# Patient Record
Sex: FEMALE | Race: WHITE
[De-identification: names, ages, dates, MRNs, and addresses within clinical notes are randomized per-mention and may not be internally consistent; named-entity substitution may affect disease eponyms.]

---

## 2019-07-10 ENCOUNTER — HOSPITAL ENCOUNTER (EMERGENCY)
Dept: HOSPITAL 75 - ER FS | Age: 19
Discharge: HOME | End: 2019-07-10
Payer: COMMERCIAL

## 2019-07-10 VITALS — WEIGHT: 115 LBS | BODY MASS INDEX: 20.38 KG/M2 | HEIGHT: 63 IN

## 2019-07-10 VITALS — SYSTOLIC BLOOD PRESSURE: 110 MMHG | DIASTOLIC BLOOD PRESSURE: 75 MMHG

## 2019-07-10 DIAGNOSIS — S13.4XXA: ICD-10-CM

## 2019-07-10 DIAGNOSIS — R40.2252: ICD-10-CM

## 2019-07-10 DIAGNOSIS — S09.90XA: Primary | ICD-10-CM
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
## 2019-07-10 DIAGNOSIS — R40.2142: ICD-10-CM

## 2019-07-10 DIAGNOSIS — R40.2362: ICD-10-CM

## 2019-07-10 PROCEDURE — 99282 EMERGENCY DEPT VISIT SF MDM: CPT

## 2019-07-10 NOTE — XMS REPORT
[facility]

                             Created on: 2018



BirdeldaTrini valiente

External Reference #: [id number]

: 2000

Sex: Female



Demographics







                          Address                   [address] [place]  [address]

 

                          Preferred Language        Unknown

 

                          Marital Status            Unknown

 

                          Sikhism Affiliation     Unknown

 

                          Race                      Unknown

 

                          Ethnic Group              Unknown





Author







                          Author                    BENOITNIXONELE

 

                          Organization              [facility]

 

                          Address                   [address] [place]  [address]



 

                          Phone                     [phone]







Care Team Providers







                    Care Team Member Name    Role                Phone

 

                    LABOYSAMMIE VALIENTE     Unavailable         [phone]







PROBLEMS







          Type      Condition    ICD9-CM Code    NUM68-XJ Code    Onset Dates    Condition Status    SNOMED

 Code

 

          Problem    Hair loss              L65.9               Active    [id number]

 

          Problem    Chronic fatigue              R53.82              Active    18461057

 

          Problem    Hypercholesteremia              E78.00              Active    94240601







ALLERGIES

No Information



ENCOUNTERS







                Encounter       Location        Date            Diagnosis

 

                          [facility]    [id number] [place] [address]

                          01 May, 2018               

 

                          [facility]     [address] [address]

                                         

 

                          [facility]     [id number] [place] [address]

                          12 Sep, 2017              Encounter to establish care with new doctor Z76.89 ; Chronic fatigue

 R53.82 ; Hair loss L65.9 and Vaginal candidiasis B37.3

 

                          [facility]     [id number] [place] [address]

                          08 Sep, 2017               

 

                          [facility]     [id number] [place] [address]

                          21 Aug, 2017              Chronic fatigue R53.82 and Hair loss L65.9

 

                          [facility]    [address] [place] [id number]

                                        Dental examination V72.2

 

                          [facility]     [address] [address]

                                        Headache 784.0 and Sinusitis 473.9

 

                          [facility]     [address] [address]

                          08 Sep, 2014               

 

                          [facility]     [address] [facility] [id number] [address]

                          08 Sep, 2014               

 

                          [facility]     [address] [facility] [id number] [place] [address]

                          16 Sep, 2013               

 

                          [facility]     [address] [facility] [id number] [place] [address]

                          15 Aug, 2011               

 

                          [facility]     [address] [facility] [id number] [place] [address]

                          12 May, 2011               







IMMUNIZATIONS

No Known Immunizations



SOCIAL HISTORY

Never Assessed



REASON FOR VISIT

triage JStrasserRN



PLAN OF CARE





VITAL SIGNS







                    Height              53 in               2018

 

                    Weight              108 lbs             2018

 

                    Temperature         98.5 degrees Fahrenheit    2018

 

                    Heart Rate          84 bpm              2018

 

                    Respiratory Rate    20                  2018

 

                    BMI                 27.03 kg/m2         2018

 

                    Blood pressure systolic    100 mmHg            2018

 

                    Blood pressure diastolic    70 mmHg             2018







MEDICATIONS

Unknown Medications



RESULTS

No Results



PROCEDURES

No Known procedures



INSTRUCTIONS





MEDICATIONS ADMINISTERED

No Known Medications

## 2019-07-10 NOTE — XMS REPORT
[facility]

                             Created on: 2018



BirdeldaTrini valiente

External Reference #: [id number]

: 2000

Sex: Female



Demographics







                          Address                   [address] [place]  [address]

 

                          Preferred Language        Unknown

 

                          Marital Status            Unknown

 

                          Sikh Affiliation     Unknown

 

                          Race                      Unknown

 

                          Ethnic Group              Unknown





Author







                          Author                    LABOYSAMMIE VALIENTE

 

                          Organization              [facility]

 

                          Address                   [address] [place]  [address]



 

                          Phone                     [phone]







Care Team Providers







                    Care Team Member Name    Role                Phone

 

                    LABOYSAMMIE VALIENTE     Unavailable         [phone]







PROBLEMS







          Type      Condition    ICD9-CM Code    UHH60-JZ Code    Onset Dates    Condition Status    SNOMED

 Code

 

          Problem    Hair loss              L65.9               Active    385202925

 

          Problem    Chronic fatigue              R53.82              Active    98175523

 

          Problem    Hypercholesteremia              E78.00              Active    96406320







ALLERGIES

No Known Allergies



ENCOUNTERS







                Encounter       Location        Date            Diagnosis

 

                          [place] [address] [address]

                                         

 

                          [facility]     [address] [address]

                                         

 

                          [facility]     [id number] [place] [address]

                          12 Sep, 2017              Encounter to establish care with new doctor Z76.89 ; Chronic fatigue

 R53.82 ; Hair loss L65.9 and Vaginal candidiasis B37.3

 

                          [place] [id number] [place] [address]

                          08 Sep, 2017               

 

                          [facility]     [id number] [place] [address]

                          21 Aug, 2017              Chronic fatigue R53.82 and Hair loss L65.9

 

                          [facility]    [address] [place] [id number]

                                        Dental examination V72.2

 

                          [address] [address]

                                        Headache 784.0 and Sinusitis 473.9

 

                          [place] [address] [address]

                          08 Sep, 2014               

 

                          [facility]     [address] [facility] [id number] [place] [address]

                          08 Sep, 2014               

 

                          [facility]     [address] [facility] [id number] [place] [address]

                          16 Sep, 2013               

 

                          [facility]     [address] [facility] [id number] [place] [address]

                          15 Aug, 2011               

 

                          [facility]     [address] [facility] [id number] [place] [address]

                          12 May, 2011               







IMMUNIZATIONS

No Known Immunizations



SOCIAL HISTORY

Never Assessed



REASON FOR VISIT

Fatigue/ hair loss x several months---Quan, seen by PCP, Yenni Moura, anita boone feels nothing was done and would like second opinion, has records



PLAN OF CARE







                          Activity                  Details









                                         









                          Follow Up                 4 Weeks Reason:est care







VITAL SIGNS







                    Height              53 in               2017

 

                    Weight              114 lbs             2017

 

                    Temperature         97.7 degrees Fahrenheit    2017

 

                    Heart Rate          80 bpm              2017

 

                    Respiratory Rate    20                  2017

 

                    BMI                 28.53 kg/m2         2017

 

                    Blood pressure systolic    98 mmHg             2017

 

                    Blood pressure diastolic    64 mmHg             2017







MEDICATIONS

Unknown Medications



RESULTS

No Results



PROCEDURES







                Procedure       Date Ordered    Result          Body Site

 

                Hemoglobin Test Send Out 0 dollar    Aug 21, 2017                     

 

                COMPLETE CBC W/AUTO DIFF WBC    Aug 21, 2017                     

 

                ASSAY THYROID STIM HORMONE    Aug 21, 2017                     

 

                VENIPUNCT, ROUTINE*    Aug 21, 2017                     







INSTRUCTIONS





MEDICATIONS ADMINISTERED

No Known Medications

## 2019-07-10 NOTE — XMS REPORT
[facility]

                             Created on: 2018



BirdeldaTrini valiente

External Reference #: [id number]

: 2000

Sex: Female



Demographics







                          Address                   [address] [place]  [address]

 

                          Preferred Language        Unknown

 

                          Marital Status            Unknown

 

                          Judaism Affiliation     Unknown

 

                          Race                      Unknown

 

                          Ethnic Group              Unknown





Author







                          Author                    LABOYSAMMIE VALIENTE

 

                          Organization              [facility]

 

                          Address                   [address] [place]  [address]



 

                          Phone                     [phone]







Care Team Providers







                    Care Team Member Name    Role                Phone

 

                    LABOYSAMMIE VALIENTE     Unavailable         [phone]







PROBLEMS







          Type      Condition    ICD9-CM Code    SNH41-NL Code    Onset Dates    Condition Status    SNOMED

 Code

 

          Problem    Hair loss              L65.9               Active    800562440

 

          Problem    Chronic fatigue              R53.82              Active    65478268

 

          Problem    Hypercholesteremia              E78.00              Active    14374157







ALLERGIES

No Known Allergies



ENCOUNTERS







                Encounter       Location        Date            Diagnosis

 

                          [address] [place] [address] [address]

                                         

 

                          [facility]     [address] [address]

                                         

 

                          [facility]     [id number] [place] [address]

                          12 Sep, 2017              Encounter to establish care with new doctor Z76.89 ; Chronic fatigue

 R53.82 ; Hair loss L65.9 and Vaginal candidiasis B37.3

 

                          [place] [id number] [place] [address]

                          08 Sep, 2017               

 

                          [facility]     [id number] [place] [address]

                          21 Aug, 2017              Chronic fatigue R53.82 and Hair loss L65.9

 

                          [facility]    [address] [place] [phone]

                                        Dental examination V72.2

 

                          [address] [address] [address]

                                        Headache 784.0 and Sinusitis 473.9

 

                          [address] [address] [address]

                          08 Sep, 2014               

 

                          [facility]     [address] [facility] [id number] [address]

                          08 Sep, 2014               

 

                          [facility]     [address] [facility] [id number], [place] [address]

                          16 Sep, 2013               

 

                          [facility]     [address] [facility] [id number], [place] [address]

                          15 Aug, 2011               

 

                          [facility]     [id number] [address]

                          12 May, 2011               







IMMUNIZATIONS

No Known Immunizations



SOCIAL HISTORY

Never Assessed



REASON FOR VISIT

PMH obtained.  TGrosdidikimberly RN



PLAN OF CARE





VITAL SIGNS





MEDICATIONS

Unknown Medications



RESULTS

No Results



PROCEDURES

No Known procedures



INSTRUCTIONS





MEDICATIONS ADMINISTERED

No Known Medications

## 2019-07-10 NOTE — XMS REPORT
Continuity of Care Document

                             Created on: 07/10/2019



Trini Bowles

External Reference #: [id number]

: 2000

Sex: Female



Demographics







                          Address                   [address]

[place]  [address]

 

                          Home Phone                [phone]

 

                          Preferred Language        Unknown

 

                          Marital Status            Unknown

 

                          Sikh Affiliation     Unknown

 

                          Race                      Unknown

 

                          Ethnic Group              Unknown





Author







                          Organization              Unknown

 

                          Address                   Unknown

 

                          Phone                     [phone]



              



Allergies

      



There is no data.                  



Medications

      



There is no data.                  



Problems

      





             Date Dx Coded              Attending              Type              Code              Diagnosis

                                        Diagnosed By        

 

                2011              IZZY SOMMER DDS N                              564.00     

                          UNSPECIFIED CONSTIPATION                       

 

                2011              IZZY SOMMER DDS N                              788.1      

                          DYSURIA                            

 

                08/15/2011              IZZY SOMMER DDS N                              238.2      

                          NEOPLASM OF UNCERTAIN BEHAVIOR OF SKIN                       



                      



Procedures

      



There is no data.                  



Results

      





                    Test                Result              Range        









                                        HEPATITIS PROFILE - 17 10:31         









                    Hep A Ab, IgM              Negative               Negative        

 

                    HBsAg Screen              Negative               Negative        

 

                    Hep B Core Ab, IgM              Negative               Negative        

 

                    Hep C Virus Ab              <0.1 s/co ratio              0.0-0.9        









                                        GC/CHLAMYDIA (SWAB OR URINE)-RAPID - 19 11:55         









                    CHLAMYDIA TRACHOMATIS RNA, TMA              NOT DETECTED               NOT DETECTED 

       

 

                    NEISSERIA GONORRHOEAE RNA, TMA              NOT DETECTED               NOT DETECTED 

       

 

                    COMMENT                                  NRG        









                                        GC/CHLAMYDIA (SWAB OR URINE)-RAPID - 19 15:07         









                    CHLAMYDIA TRACHOMATIS RNA, TMA              NOT DETECTED               NOT DETECTED 

       

 

                    NEISSERIA GONORRHOEAE RNA, TMA              NOT DETECTED               NOT DETECTED 

       

 

                    COMMENT                                  NRG        



                    



Encounters

      





                ACCT No.              Visit Date/Time              Discharge              Status      

                Pt. Type              Provider              Facility              Loc./Unit      

                                        Complaint        

 

                [id number]              2019 10:40:00              2019 23:59:59              

CLS              Outpatient              KRYSTAL VILLALBA                              [facility]

 [place] [facility]                                 

 

             [id number]              2019 14:20:00                                            Document

 Registration                                                                       

 

             [id number]              2019 10:40:00                                            Document

 Registration                                                                       

 

             [id number]              2017 09:20:00                                            Document

 Registration                                                                       

 

                [id number]              2011 14:41:00              2011 23:59:59              

CLS                 Outpatient              IZZY SOMMER DDS

## 2019-07-10 NOTE — XMS REPORT
[facility]

                             Created on: 2018



KameronTrini valiente

External Reference #: [id number]

: 2000

Sex: Female



Demographics







                          Address                   [address] [place]  [address]

 

                          Preferred Language        Unknown

 

                          Marital Status            Unknown

 

                          Scientologist Affiliation     Unknown

 

                          Race                      Unknown

 

                          Ethnic Group              Unknown





Author







                          Author                    LABOYSAMMIE VALIENTE

 

                          Organization              [facility]

 

                          Address                   [address] [place]  [address]



 

                          Phone                     [phone]







Care Team Providers







                    Care Team Member Name    Role                Phone

 

                    LABOYSAMMIE VALIENTE     Unavailable         [phone]







PROBLEMS







          Type      Condition    ICD9-CM Code    FXR19-JO Code    Onset Dates    Condition Status    SNOMED

 Code

 

          Problem    Hair loss              L65.9               Active    684221836

 

          Problem    Chronic fatigue              R53.82              Active    95554515

 

          Problem    Hypercholesteremia              E78.00              Active    82125015







ALLERGIES

No Known Allergies



ENCOUNTERS







                Encounter       Location        Date            Diagnosis

 

                          [facility]     [id number] [place] [address]

                                         

 

                          [facility]     [id number] [place] [address]

                          12 Sep, 2017              Encounter to establish care with new doctor Z76.89 ; Chronic fatigue

 R53.82 ; Hair loss L65.9 and Vaginal candidiasis B37.3

 

                          [facility]     [id number] [place] [address]

                          08 Sep, 2017               

 

                          [facility]     [id number] [place] [address]

                          21 Aug, 2017              Chronic fatigue R53.82 and Hair loss L65.9

 

                          [facility]    [id number] [place] [id number]

                                        Dental examination V72.2

 

                          [facility]     [address] [address]

                                        Headache 784.0 and Sinusitis 473.9

 

                          [facility]     [id number] [place] [address]

                          08 Sep, 2014               

 

                          [facility]     [address] [address]

                          08 Sep, 2014               

 

                          [facility]     [address] [facility] [id number] [address]

                          16 Sep, 2013               

 

                          [facility]     [address] [facility] [id number] [address]

                          15 Aug, 2011               

 

                          [facility]     [address] [facility] [id number] [address]

                          12 May, 2011               







IMMUNIZATIONS

No Known Immunizations



SOCIAL HISTORY

Never Assessed



REASON FOR VISIT

Establish Care---ZANDRAennettRMADELYN



PLAN OF CARE







                          Activity                  Details









                                         









                          Follow Up                 4 Weeks Reason:weight loss follow up







VITAL SIGNS







                    Height              53 in               2017

 

                    Weight              107 lbs             2017

 

                    Temperature         97.6 degrees Fahrenheit    2017

 

                    Heart Rate          80 bpm              2017

 

                    Respiratory Rate    20                  2017

 

                    BMI                 26.78 kg/m2         2017

 

                    Blood pressure systolic    102 mmHg            2017

 

                    Blood pressure diastolic    70 mmHg             2017







MEDICATIONS







        Medication    Instructions    Dosage    Frequency    Start Date    End Date    Duration    Status



 

             Diflucan 150 MG    Orally Once a day    1 tablet today and repeat in 7 days    24h          12

 Sep, 2017          14 Sep, 2017        2 days              Active







RESULTS







                Name            Result          Date            Reference Range

 

                VITAMIN B12                     2017       

 

                Vitamin B12     498                             211-946

 

                FOLATE (FOLIC ACID)                    2017       

 

                Folate (Folic Acid), Serum    >20.0                           >3.0

 

                ESR/SED RATE                    2017       

 

                Sedimentation Rate-Westergren    2                               0-32

 

                CRP                             2017       

 

                C-Reactive Protein, Quant    <0.3                            0.0-4.9

 

                VITAMIN D, 25-H                    2017       

 

                Vitamin D, 25-Hydroxy    36.0                            30.0-100.0

 

                LIPID PANEL                     2017       

 

                Cholesterol, Total    177                             100-169

 

                Triglycerides    75                              0-89

 

                HDL Cholesterol    61                              >39

 

                VLDL Cholesterol Saúl    15                              5-40

 

                LDL Cholesterol Calc    101                             0-109

 

                CMP                             2017       

 

                Glucose, Serum    81                              65-99

 

                BUN             8                               6-20

 

                Creatinine, Serum    0.74                            0.57-1.00

 

                eGFR If NonAfricn Am    118                                 >59

 

                eGFR If Africn Am    136                                 >59

 

                BUN/Creatinine Ratio    11                              9-23

 

                Sodium, Serum    141                             134-144

 

                Potassium, Serum    4.1                             3.5-5.2

 

                Chloride, Serum    102                             

 

                Carbon Dioxide, Total    24                              18-29

 

                Calcium, Serum    9.5                             8.7-10.2

 

                Protein, Total, Serum    6.7                             6.0-8.5

 

                Albumin, Serum    4.4                             3.5-5.5

 

                Globulin, Total    2.3                             1.5-4.5

 

                A/G Ratio       1.9                             1.2-2.2

 

                Bilirubin, Total    0.4                             0.0-1.2

 

                Alkaline Phosphatase, S    33                              

 

                AST (SGOT)      12                              0-40

 

                ALT (SGPT)      15                              0-32

 

                HEPATITIS PROFILE                    2017       

 

                Hep A Ab, IgM    Negative                        Negative

 

                HBsAg Screen    Negative                        Negative

 

                Hep B Core Ab, IgM    Negative                        Negative

 

                Hep C Virus Ab    <0.1                            0.0-0.9

 

                HIV (STATE)                     2017       







PROCEDURES







                Procedure       Date Ordered    Result          Body Site

 

                VENIPUNCT, ROUTINE*    2017                     

 

                COMPREHEN METABOLIC PANEL    2017                     

 

                LIPID PANEL     2017                     

 

                ACUTE HEPATITIS PANEL    2017                     

 

                C-REACTIVE PROTEIN    2017                     

 

                No Charge       2017                     

 

                ASSAY OF VITAMIN D    2017                     

 

                VITAMIN B-12    2017                     

 

                RBC SED RATE, AUTOMATED    2017                     

 

                BLOOD FOLIC ACID SERUM    2017                     







INSTRUCTIONS





MEDICATIONS ADMINISTERED

No Known Medications

## 2019-07-10 NOTE — XMS REPORT
[facility]

                             Created on: 2015



Trini Bowles

External Reference #: [id number]

: 2000

Sex: Female



Demographics







                          Address                   [address] [place] [address]  [address]

 

                          Home Phone                [phone]

 

                          Preferred Language        Unknown

 

                          Marital Status            Unknown

 

                          Sabianism Affiliation     Unknown

 

                          Race                      White

 

                          Ethnic Group              Not  or 





Author







                          Author                    KELSEY YANEZ

 

                          Organization              [employer]

 

                          Address                   Unknown

 

                          Phone                     Unavailable







Care Team Providers







                    Care Team Member Name    Role                Phone

 

                    KELSEY YANEZ                      Unavailable



                                                                



Allergies

          No Known Allergies                                                    
                                   



Problems

          





             Problem Type    Condition    ICD-9 Code    Onset Dates    Condition Status

 

             Assessment    Dental examination    V72.2                     Active



                                                                                
       



Medications

          No Known Medications                                                  
                                     



Procedures

          





                Procedure       Coding System    Code            Date

 

                PANORAMIC FILM SEE ALSO CODE 24878    CPT-4                      2015

 

                LTD ORAL EVALUATION - PROBLEM FOCUS    CPT-4                      2015



                                                                                
       



Results

          No Known Results                                                      
             



Summary Purpose

          [employer] Submission

## 2019-07-10 NOTE — XMS REPORT
[facility]

                             Created on: 10/24/2018



JelenaTriin

External Reference #: [id number]

: 2000

Sex: Female



Demographics







                          Address                   [address]

[place]  [address]

 

                          Preferred Language        Unknown

 

                          Marital Status            Unknown

 

                          Zoroastrianism Affiliation     Unknown

 

                          Race                      Unknown

 

                          Ethnic Group              Unknown





Author







                          Author                    ESA KIMBLE

 

                          [facility]

 

                          Address                   Unknown

 

                          Phone                     [phone]







Care Team Providers







                    Care Team Member Name    Role                Phone

 

                    ESA KIMBLE     Unavailable         [phone]







PROBLEMS







          Type      Condition    ICD9-CM Code    PLO61-QX Code    Onset Dates    Condition Status    SNOMED

 Code

 

          Problem    Hair loss              L65.9               Active    517487996

 

          Problem    Chronic fatigue              R53.82              Active    05294020

 

          Problem    Hypercholesteremia              E78.00              Active    26596242







ALLERGIES

No Information



ENCOUNTERS







                Encounter       Location        Date            Diagnosis

 

                          [facility]    [address] [id number]

                          22 Oct, 2018               

 

                          [facility]    [id number] [place] [address]

                          01 May, 2018               

 

                          [facility]     [address] [address]

                                         

 

                          [facility]     [address] [address]

                          12 Sep, 2017              Encounter to establish care with new doctor Z76.89 ; Chronic fatigue

 R53.82 ; Hair loss L65.9 and Vaginal candidiasis B37.3

 

                          [facility]     [id number] [place] [address]

                          08 Sep, 2017               

 

                          [facility]     [address] [address]

                          21 Aug, 2017              Chronic fatigue R53.82 and Hair loss L65.9

 

                          [facility]    [address] [id number]

                                        Dental examination V72.2

 

                          [facility]     [id number] [place] [address]

                                        Headache 784.0 and Sinusitis 473.9

 

                          [facility]     [id number] [address]

                          08 Sep, 2014               

 

                          [facility]     [id number] [address]

                          08 Sep, 2014               

 

                          [facility]     [id number] [address]

                          16 Sep, 2013               

 

                          [facility]     [id number] [address]

                          15 Aug, 2011               

 

                          [facility]     [id number] [address]

                          12 May, 2011               







IMMUNIZATIONS

No Known Immunizations



SOCIAL HISTORY

Never Assessed



REASON FOR VISIT

Dental appt



PLAN OF CARE





VITAL SIGNS





MEDICATIONS

Unknown Medications



RESULTS

No Results



PROCEDURES

No Known procedures



INSTRUCTIONS





MEDICATIONS ADMINISTERED

No Known Medications
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
